# Patient Record
Sex: MALE | Race: ASIAN | NOT HISPANIC OR LATINO | URBAN - METROPOLITAN AREA
[De-identification: names, ages, dates, MRNs, and addresses within clinical notes are randomized per-mention and may not be internally consistent; named-entity substitution may affect disease eponyms.]

---

## 2018-01-09 ENCOUNTER — EMERGENCY (EMERGENCY)
Facility: HOSPITAL | Age: 23
LOS: 1 days | Discharge: HOME | End: 2018-01-09
Admitting: EMERGENCY MEDICINE
Payer: MEDICAID

## 2018-01-09 VITALS
SYSTOLIC BLOOD PRESSURE: 108 MMHG | HEART RATE: 94 BPM | DIASTOLIC BLOOD PRESSURE: 57 MMHG | TEMPERATURE: 98 F | RESPIRATION RATE: 16 BRPM | OXYGEN SATURATION: 100 %

## 2018-01-09 VITALS
OXYGEN SATURATION: 99 % | TEMPERATURE: 99 F | DIASTOLIC BLOOD PRESSURE: 69 MMHG | SYSTOLIC BLOOD PRESSURE: 108 MMHG | RESPIRATION RATE: 15 BRPM | HEART RATE: 86 BPM

## 2018-01-09 PROCEDURE — 99282 EMERGENCY DEPT VISIT SF MDM: CPT

## 2018-01-09 RX ORDER — TETANUS TOXOID, REDUCED DIPHTHERIA TOXOID AND ACELLULAR PERTUSSIS VACCINE, ADSORBED 5; 2.5; 8; 8; 2.5 [IU]/.5ML; [IU]/.5ML; UG/.5ML; UG/.5ML; UG/.5ML
0.5 SUSPENSION INTRAMUSCULAR ONCE
Qty: 0 | Refills: 0 | Status: COMPLETED | OUTPATIENT
Start: 2018-01-09 | End: 2018-01-09

## 2018-01-09 RX ADMIN — Medication 1 TABLET(S): at 22:58

## 2018-01-09 RX ADMIN — TETANUS TOXOID, REDUCED DIPHTHERIA TOXOID AND ACELLULAR PERTUSSIS VACCINE, ADSORBED 0.5 MILLILITER(S): 5; 2.5; 8; 8; 2.5 SUSPENSION INTRAMUSCULAR at 22:58

## 2018-01-09 NOTE — ED PROVIDER NOTE - PROGRESS NOTE DETAILS
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 will give TDAP and augmentin x 10 days with PMD follow up.
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
## 2018-01-09 NOTE — ED PROVIDER NOTE - PLAN OF CARE
Rest, drink plenty of fluids.  Advance activity as tolerated.  Continue all previously prescribed medications as directed. Take Augmentin twice a day for 10 days.  Follow up with your primary care physician in 48-72 hours- bring copies of your results.  Return to the Emergency Department for fevers, swelling, redness to area worsening or persistent symptoms OR ANY NEW OR CONCERNING SYMPTOMS.
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
## 2019-11-04 ENCOUNTER — EMERGENCY (EMERGENCY)
Facility: HOSPITAL | Age: 24
LOS: 1 days | Discharge: HOME | End: 2019-11-04
Attending: EMERGENCY MEDICINE | Admitting: EMERGENCY MEDICINE
Payer: MEDICAID

## 2019-11-04 VITALS
DIASTOLIC BLOOD PRESSURE: 78 MMHG | TEMPERATURE: 98 F | SYSTOLIC BLOOD PRESSURE: 137 MMHG | HEART RATE: 68 BPM | RESPIRATION RATE: 14 BRPM | OXYGEN SATURATION: 100 %

## 2019-11-04 PROCEDURE — 99283 EMERGENCY DEPT VISIT LOW MDM: CPT

## 2019-11-05 RX ORDER — IBUPROFEN 200 MG
600 TABLET ORAL ONCE
Refills: 0 | Status: COMPLETED | OUTPATIENT
Start: 2019-11-05 | End: 2019-11-05

## 2019-11-05 RX ORDER — CARBAMIDE PEROXIDE 81.86 MG/ML
5 SOLUTION/ DROPS AURICULAR (OTIC)
Qty: 1 | Refills: 0
Start: 2019-11-05 | End: 2019-11-08

## 2019-11-05 NOTE — ED PROVIDER NOTE - NSFOLLOWUPINSTRUCTIONS_ED_ALL_ED_FT
You were seen in the emergency department for right ear pain.  You were found to have complete occlusion of your right ear canal with wax (cerumen impaction).  Manual removal was done at bedside, but you continue to have a lot of wax in your ear.  You were prescribed debrox - instill 5 drops in your right ear and let it sit in the ear for at least 10 minutes twice a day for the next 4 days.  You can take ibuprofen 600mg every 6 hours or Tylenol 650mg every 4 hours as needed for pain.  Follow-up with your PMD in 4 days after completing the regimen of debrox.  Return to the emergency department for any new or worsening symptoms.

## 2019-11-05 NOTE — ED PROVIDER NOTE - RIGHT EAR
complete cerumen impaction of right ear canal; L canal with mild wax, normal TM; no auricular tenderness, no mastoid tenderness

## 2019-11-05 NOTE — ED PROVIDER NOTE - PATIENT PORTAL LINK FT
You can access the FollowMyHealth Patient Portal offered by [facility] by registering at the following website: [email or web address]. By joining [employer]’s FollowMyHealth portal, you will also be able to view your health information using other applications (apps) compatible with our system.

## 2019-11-05 NOTE — ED PROVIDER NOTE - OBJECTIVE STATEMENT
22 y/o healthy M with 4 days of right ear pain.  Denies associated fever, chills, uri sxs, cough, congestion.  No recent trauma, swimming, or qtip use.

## 2019-11-05 NOTE — ED PROVIDER NOTE - CLINICAL SUMMARY MEDICAL DECISION MAKING FREE TEXT BOX
24 y/o M with 4 days right ear pain with complete cerumen impaction of right external canal.  No auricular tenderness, no mastoid tenderness, no uri or infections s/s to suspect otitis media.  No e/o otitis externa.  Attempted manual cerumen disimpaction at bedside with significant removal, but given extent of impaction will dc home with debrox, cont nsaids/tylenol prn pain, pmd follow-up.

## 2021-10-09 NOTE — ED PROVIDER NOTE - TEMPLATE, MLM
Pt received 7.25mg Dextrose 50% IV push, Dexcom reading BS of 92. Patient states hypoglycemic symptoms are improved.      Marianna Goodwin RN  10/09/21 1844 EENMT Earache